# Patient Record
Sex: MALE | Race: WHITE | NOT HISPANIC OR LATINO | URBAN - METROPOLITAN AREA
[De-identification: names, ages, dates, MRNs, and addresses within clinical notes are randomized per-mention and may not be internally consistent; named-entity substitution may affect disease eponyms.]

---

## 2019-11-29 ENCOUNTER — DOCUMENTATION (OUTPATIENT)
Dept: AUDIOLOGY | Age: 34
End: 2019-11-29

## 2019-11-29 NOTE — PROGRESS NOTES
Progress Note    Name:  Barb Ayon  :  1985  Age:  37 y o  Date of Evaluation: 19     Scanned in HA chart         Kayleigh Bui , CCC-A  Clinical Audiologist